# Patient Record
Sex: MALE | Race: WHITE
[De-identification: names, ages, dates, MRNs, and addresses within clinical notes are randomized per-mention and may not be internally consistent; named-entity substitution may affect disease eponyms.]

---

## 2018-02-05 ENCOUNTER — HOSPITAL ENCOUNTER (OUTPATIENT)
Dept: HOSPITAL 61 - KCIC MRI | Age: 48
Discharge: HOME | End: 2018-02-05
Attending: PODIATRIST
Payer: COMMERCIAL

## 2018-02-05 DIAGNOSIS — R60.0: ICD-10-CM

## 2018-02-05 DIAGNOSIS — M25.571: Primary | ICD-10-CM

## 2018-02-05 PROCEDURE — 73718 MRI LOWER EXTREMITY W/O DYE: CPT

## 2019-12-23 ENCOUNTER — HOSPITAL ENCOUNTER (EMERGENCY)
Dept: HOSPITAL 61 - ER | Age: 49
Discharge: HOME | End: 2019-12-23
Payer: COMMERCIAL

## 2019-12-23 VITALS — WEIGHT: 280 LBS | BODY MASS INDEX: 40.09 KG/M2 | HEIGHT: 70 IN

## 2019-12-23 VITALS — DIASTOLIC BLOOD PRESSURE: 94 MMHG | SYSTOLIC BLOOD PRESSURE: 183 MMHG
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
## 2019-12-23 DIAGNOSIS — S61.211A: Primary | ICD-10-CM
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
## 2019-12-23 PROCEDURE — 12002 RPR S/N/AX/GEN/TRNK2.6-7.5CM: CPT

## 2019-12-23 PROCEDURE — 99283 EMERGENCY DEPT VISIT LOW MDM: CPT

## 2019-12-23 RX ADMIN — LIDOCAINE HYDROCHLORIDE ONE ML: 10 INJECTION, SOLUTION INFILTRATION; PERINEURAL at 12:00

## 2019-12-23 NOTE — PHYS DOC
Past Medical History


Past Medical History:  No Pertinent History


Past Surgical History:  Other


Additional Past Surgical Histo:  RT SHOULDER ROTATOR CUFF REPAIR


Alcohol Use:  None


Drug Use:  None





Adult General


Chief Complaint


Chief Complaint:  LACERATION/AVULSION





HPI


HPI





Patient is a 49  year old right-handed male patient presented to the ED today 

with left index finger laceration, patient accidentally cut himself with a 

pocket knife.





Review of Systems


Review of Systems





Constitutional: Denies fever or chills []





Musculoskeletal: Denies back pain or joint pain []


Integument: Reports left index finger laceration


Neurologic: Denies headache, focal weakness or sensory changes []








All other systems were reviewed and found to be within normal limits, except as 

documented in this note.





Current Medications


Current Medications





Current Medications








 Medications


  (Trade)  Dose


 Ordered  Sig/José Luis  Start Time


 Stop Time Status Last Admin


Dose Admin


 


 Diphtheria/


 Tetanus/Acell


 Pertussis


  (Boostrix)  0.5 ml  ONCE ONCE  12/23/19 12:30


 12/23/19 12:31 DC  





 


 Lidocaine HCl


  (Lidocaine 1%


 20ml Vial)  20 ml  1X  ONCE  12/23/19 11:15


 12/23/19 11:16 DC  














Allergies


Allergies





Allergies








Coded Allergies Type Severity Reaction Last Updated Verified


 


  No Known Drug Allergies    6/26/15 No











Physical Exam


Physical Exam





Constitutional: Well developed, well nourished, no acute distress, non-toxic 

appearance. []





Skin: Does aspect of the left index finger proximal and with a laceration 

approximately 4 cm long, this no obvious tendon involvement. Patient able to 

flex and extend the finger at the MIP, PIP and DIP joints. Adequate radius 

sensation to the left index finger. +2 left radial pulse. Cap refill less than 2

 seconds the left index finger.


Back: No tenderness, no CVA tenderness. [] 


Extremities: No tenderness, no cyanosis, no clubbing, ROM intact, no edema. [] 


Neurologic: Alert and oriented X 3, normal motor function, normal sensory 

function, no focal deficits noted. []


Psychologic: Affect normal, judgement normal, mood normal. []





Current Patient Data


Vital Signs





                                   Vital Signs








  Date Time  Temp Pulse Resp B/P (MAP) Pulse Ox O2 Delivery O2 Flow Rate FiO2


 


12/23/19 10:57 98.5 93 16 183/94 (123) 99 Room Air  





 98.5       











EKG


EKG


[]





Radiology/Procedures


Radiology/Procedures


Laceration/Wound Repair


   Wound Location: Left index finger


   Wound's Depth, Shape: Vertical


   Wound Length (cm): 4 cm approximately


   Wound Explored:  clean


   Irrigated w/ Saline (ccs): 70


   Betadine Prep?: Yes


   Anesthesia: 1% buffered lidocaine


   Volume Anesthetic (ccs): 8


   Wound Repaired With: Ethilon 5.0 and 4.0


  


   Number of Sutures: 9 interrupted sutures


Progress  : Pressure dressing was applied to the laceration





Course & Med Decision Making


Course & Med Decision Making


Pertinent Labs and Imaging studies reviewed. (See chart for details)





This is a 49-year-old male patient presenting to the ED today with left index 

finger laceration that was closed by me as noted in procedures. Wound care 

instructions and return precautions provided. Tetanus updated.





Dragon Disclaimer


Dragon Disclaimer


This electronic medical record was generated, in whole or in part, using a voice

 recognition dictation system.





Departure


Departure


Impression:  


   Primary Impression:  


   Laceration of left index finger


Disposition:  01 HOME, SELF-CARE


Condition:  STABLE


Referrals:  


DAISY ESPARZA MD (PCP)


Follow-up with your own primary care doctor or the emergency room in 7-10 days 

for suture removal


Patient Instructions:  Laceration Care, Adult, Easy-to-Read





Additional Instructions:  


You have left index finger laceration that was closed with stitches. You can 

shower, do not soak your finger. Apply Neosporin or bacitracin to the area twice

 a day. Monitor the area for any signs of infection including but not limited to

 increased redness, warmth, yellow drainage from the area and return to the ED 

if they occur. Please follow-up with your own doctor or the emergency room in 7-

10 days for stitches removal.





Problem Qualifiers








   Primary Impression:  


   Laceration of left index finger


   Encounter type:  initial encounter  Damage to nail status:  without damage  

   Foreign body presence:  without foreign body  Qualified Codes:  S61.211A - 

   Laceration without foreign body of left index finger without damage to nail, 

   initial encounter








TEDDY MÉNDEZ              Dec 23, 2019 13:08